# Patient Record
Sex: FEMALE | Race: WHITE | NOT HISPANIC OR LATINO | Employment: FULL TIME | URBAN - METROPOLITAN AREA
[De-identification: names, ages, dates, MRNs, and addresses within clinical notes are randomized per-mention and may not be internally consistent; named-entity substitution may affect disease eponyms.]

---

## 2023-02-14 PROBLEM — N89.8 VAGINAL DISCHARGE: Status: ACTIVE | Noted: 2023-02-14

## 2023-02-14 PROBLEM — R92.8 ABNORMAL MAMMOGRAM: Status: ACTIVE | Noted: 2023-02-14

## 2023-02-14 PROBLEM — M79.672 LEFT FOOT PAIN: Status: ACTIVE | Noted: 2023-02-14

## 2023-02-14 PROBLEM — N39.0 ACUTE UTI: Status: ACTIVE | Noted: 2023-02-14

## 2023-02-14 PROBLEM — N76.0 VAGINITIS: Status: ACTIVE | Noted: 2023-02-14

## 2023-02-14 PROBLEM — J30.9 ALLERGIC RHINITIS: Status: ACTIVE | Noted: 2023-02-14

## 2023-02-14 PROBLEM — R51.9 CHRONIC HEADACHES: Status: ACTIVE | Noted: 2023-02-14

## 2023-02-14 PROBLEM — G89.29 CHRONIC HEADACHES: Status: ACTIVE | Noted: 2023-02-14

## 2023-02-14 PROBLEM — R31.9 HEMATURIA: Status: ACTIVE | Noted: 2023-02-14

## 2023-02-14 PROBLEM — F32.A MILD DEPRESSION: Status: ACTIVE | Noted: 2023-02-14

## 2023-02-14 PROBLEM — M54.2 NECK PAIN: Status: ACTIVE | Noted: 2023-02-14

## 2023-02-14 PROBLEM — M85.80 OSTEOPENIA: Status: ACTIVE | Noted: 2023-02-14

## 2023-02-14 PROBLEM — M72.2 PLANTAR FASCIITIS: Status: ACTIVE | Noted: 2023-02-14

## 2023-02-14 RX ORDER — METHYLPREDNISOLONE 4 MG/1
4 TABLET ORAL
COMMUNITY
Start: 2022-09-08 | End: 2023-03-27 | Stop reason: ALTCHOICE

## 2023-02-14 RX ORDER — CELECOXIB 200 MG/1
200 CAPSULE ORAL DAILY
COMMUNITY
Start: 2020-05-11 | End: 2023-04-04

## 2023-02-14 RX ORDER — BUPROPION HYDROCHLORIDE 150 MG/1
1 TABLET ORAL EVERY MORNING
COMMUNITY
Start: 2022-03-25 | End: 2023-04-04

## 2023-02-14 RX ORDER — METAXALONE 800 MG/1
1 TABLET ORAL 3 TIMES DAILY PRN
COMMUNITY
Start: 2018-05-23 | End: 2023-04-04

## 2023-02-14 RX ORDER — CETIRIZINE HYDROCHLORIDE, PSEUDOEPHEDRINE HYDROCHLORIDE 5; 120 MG/1; MG/1
1 TABLET, FILM COATED, EXTENDED RELEASE ORAL 2 TIMES DAILY PRN
COMMUNITY
Start: 2020-01-02 | End: 2023-04-04

## 2023-03-27 ENCOUNTER — OFFICE VISIT (OUTPATIENT)
Dept: PRIMARY CARE | Facility: CLINIC | Age: 68
End: 2023-03-27
Payer: COMMERCIAL

## 2023-03-27 ENCOUNTER — LAB (OUTPATIENT)
Dept: LAB | Facility: LAB | Age: 68
End: 2023-03-27
Payer: COMMERCIAL

## 2023-03-27 VITALS
BODY MASS INDEX: 24.96 KG/M2 | WEIGHT: 150 LBS | SYSTOLIC BLOOD PRESSURE: 112 MMHG | DIASTOLIC BLOOD PRESSURE: 75 MMHG | TEMPERATURE: 98 F | HEART RATE: 73 BPM

## 2023-03-27 DIAGNOSIS — Z01.411 ENCOUNTER FOR GYNECOLOGICAL EXAMINATION WITH ABNORMAL FINDING: ICD-10-CM

## 2023-03-27 DIAGNOSIS — E66.9 ABDOMINAL OBESITY-METABOLIC SYNDROME TYPE 3: ICD-10-CM

## 2023-03-27 DIAGNOSIS — L70.8 OTHER ACNE: ICD-10-CM

## 2023-03-27 DIAGNOSIS — M85.89 OSTEOPENIA OF MULTIPLE SITES: Primary | ICD-10-CM

## 2023-03-27 DIAGNOSIS — E78.2 MIXED HYPERLIPIDEMIA: ICD-10-CM

## 2023-03-27 DIAGNOSIS — N94.10 DYSPAREUNIA, FEMALE: ICD-10-CM

## 2023-03-27 DIAGNOSIS — E88.810 ABDOMINAL OBESITY-METABOLIC SYNDROME TYPE 3: ICD-10-CM

## 2023-03-27 DIAGNOSIS — F32.A MILD DEPRESSION: ICD-10-CM

## 2023-03-27 LAB
ALANINE AMINOTRANSFERASE (SGPT) (U/L) IN SER/PLAS: 13 U/L (ref 7–45)
ALBUMIN (G/DL) IN SER/PLAS: 4.6 G/DL (ref 3.4–5)
ALKALINE PHOSPHATASE (U/L) IN SER/PLAS: 74 U/L (ref 33–136)
ANION GAP IN SER/PLAS: 12 MMOL/L (ref 10–20)
ASPARTATE AMINOTRANSFERASE (SGOT) (U/L) IN SER/PLAS: 19 U/L (ref 9–39)
BILIRUBIN TOTAL (MG/DL) IN SER/PLAS: 0.7 MG/DL (ref 0–1.2)
CALCIUM (MG/DL) IN SER/PLAS: 9.7 MG/DL (ref 8.6–10.6)
CARBON DIOXIDE, TOTAL (MMOL/L) IN SER/PLAS: 26 MMOL/L (ref 21–32)
CHLORIDE (MMOL/L) IN SER/PLAS: 106 MMOL/L (ref 98–107)
CHOLESTEROL (MG/DL) IN SER/PLAS: 225 MG/DL (ref 0–199)
CHOLESTEROL IN HDL (MG/DL) IN SER/PLAS: 76 MG/DL
CHOLESTEROL/HDL RATIO: 3
CREATININE (MG/DL) IN SER/PLAS: 0.87 MG/DL (ref 0.5–1.05)
ESTIMATED AVERAGE GLUCOSE FOR HBA1C: 97 MG/DL
GFR FEMALE: 73 ML/MIN/1.73M2
GLUCOSE (MG/DL) IN SER/PLAS: 86 MG/DL (ref 74–99)
HEMOGLOBIN A1C/HEMOGLOBIN TOTAL IN BLOOD: 5 %
LDL: 134 MG/DL (ref 0–99)
POTASSIUM (MMOL/L) IN SER/PLAS: 4.5 MMOL/L (ref 3.5–5.3)
PROTEIN TOTAL: 6.9 G/DL (ref 6.4–8.2)
SODIUM (MMOL/L) IN SER/PLAS: 139 MMOL/L (ref 136–145)
TRIGLYCERIDE (MG/DL) IN SER/PLAS: 75 MG/DL (ref 0–149)
UREA NITROGEN (MG/DL) IN SER/PLAS: 19 MG/DL (ref 6–23)
VLDL: 15 MG/DL (ref 0–40)

## 2023-03-27 PROCEDURE — 99214 OFFICE O/P EST MOD 30 MIN: CPT | Performed by: FAMILY MEDICINE

## 2023-03-27 PROCEDURE — 83036 HEMOGLOBIN GLYCOSYLATED A1C: CPT

## 2023-03-27 PROCEDURE — 80053 COMPREHEN METABOLIC PANEL: CPT

## 2023-03-27 PROCEDURE — 99397 PER PM REEVAL EST PAT 65+ YR: CPT | Performed by: FAMILY MEDICINE

## 2023-03-27 PROCEDURE — 80061 LIPID PANEL: CPT

## 2023-03-27 PROCEDURE — 1159F MED LIST DOCD IN RCRD: CPT | Performed by: FAMILY MEDICINE

## 2023-03-27 PROCEDURE — 1036F TOBACCO NON-USER: CPT | Performed by: FAMILY MEDICINE

## 2023-03-27 PROCEDURE — 36415 COLL VENOUS BLD VENIPUNCTURE: CPT

## 2023-03-27 ASSESSMENT — PATIENT HEALTH QUESTIONNAIRE - PHQ9
1. LITTLE INTEREST OR PLEASURE IN DOING THINGS: NOT AT ALL
2. FEELING DOWN, DEPRESSED OR HOPELESS: NOT AT ALL
SUM OF ALL RESPONSES TO PHQ9 QUESTIONS 1 & 2: 0

## 2023-03-27 ASSESSMENT — LIFESTYLE VARIABLES
HOW OFTEN DO YOU HAVE A DRINK CONTAINING ALCOHOL: NEVER
HOW MANY STANDARD DRINKS CONTAINING ALCOHOL DO YOU HAVE ON A TYPICAL DAY: PATIENT DOES NOT DRINK

## 2023-03-27 NOTE — PATIENT INSTRUCTIONS
Weight Management  Provided order for labs with fasting. Will call with the results.   Advised to reduce carbohydrates to 50-75 carbohydrates.  First, keep a food log for 3 days to find average calorie and carbohydrate intake.     Health Maintenance  Patient will call with dates of Prevnar 20.  Discussed Shingrix Series. Patient will call with those dates when she completes them.   Pap Smear Completed in office. Will call with results.     Acne  Advised to use oil free products  Try the Differin gel once daily for 6 - 8 weeks.   Provided referral to new Dermatologist, Dr Cruz.     Pain During [place]  Should patient become sexually active, advised to try lubricant.   If pain persists call for an estrogen cream.

## 2023-03-27 NOTE — PROGRESS NOTES
Subjective   Patient ID: Carlos Layton is a 67 y.o. female who presents for Annual Exam (Here for a physical).    HPI   She believes she has insulin resistance. She has started going to the gym and has gained weight. She does not feel she has done enough to gain muscle weight. She reports that her breasts and her stomach have been gaining weight the most. She has a friend who is a  and gave her an eating plan. She states she follows the plan most of the time. She is requesting to check her A1C. She denies family history of diabetes or PCOS. Her mother had a hysterectomy when she was in her 40s. She has a family history of hypertension.     She is up to date on mammogram in 2022. Her last pap was in February of 2020. Will do today.  She notes she has had a few abnormal results. She is planning to schedule her colonoscopy next.     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 She plans to complete Shingrix Series after that.     She went to dermatology for breakouts on the face. She was told that they did not see any breakouts. However, she has one today.    She is not sexually active, but states the last time she was it was painful.     Review of Systems    Objective   /75   Pulse 73   Temp 36.7 °C (98 °F)   Wt 68 kg (150 lb)   BMI 24.96 kg/m²     Physical exam   HEENT unremarkable  Neck supple no adenopathy or mass  Heart regular S1-S2 without murmur or ectopy  Lungs clear to auscultation  Breast exam no fixed mass or nodule no nipple discharge or retraction no skin changes no supraclavicular or axillary adenopathy  Abdomen is soft nontender physiologic bowel sounds  Pelvic exam cervix appears normal there is no vaginal discharge there is mild thinning of vaginal mucosa bimanual exam is normal without any adnexal masses or tenderness and uterus is not enlarged extremities no edema or cyanosis peripheral pulses are intact and symmetrical    Assessment/Plan   Problem  List Items Addressed This Visit    Reviewed Labs from 04/01/2022, which  revealed blood count WNL.  Chemistries in good range with FBS of 81. Total Cholesterol 215, HDL 79.5, , Triglycerides 49    TSH WNL  Vit D  30      Nervous    Chronic headaches - Primary       Musculoskeletal    Osteopenia       Other    Mild depression (CMS/HCC)   Meds no changes working well  Weight Management  Provided order for labs with fasting. Will call with the results.   Advised to reduce carbohydrates to 50-75 carbohydrates.  First, keep a food log for 3 days to find average calorie and carbohydrate intake.     Health Maintenance  Patient will call with date of Prevnar 20 injection.  Discussed Shingrix Series. Patient will call with those dates when she completes them.   Pap Smear Completed in office. Will call with results.     Acne  Advised to use oil free products  Try the Differin gel once daily for 6 - 8 weeks.   Provided referral to new Dermatologist, Dr Cruz if needed.     Pain During [place]  Should patient become sexually active, advised to try lubricant.   If pain persists call for an estrogen cream.   Problem List Items Addressed This Visit          Nervous    Dyspareunia, female       Musculoskeletal    Osteopenia - Primary       Other    Mild depression (CMS/HCC)    Mixed hyperlipidemia    Relevant Orders    Lipid Panel (Completed)    Other acne     Other Visit Diagnoses       Abdominal obesity-metabolic syndrome type 3        Relevant Orders    Hemoglobin A1C (Completed)    Comprehensive Metabolic Panel (Completed)          Follow up visit in 6 months, unless a visit is needed prior.      Scribe Attestation  By signing my name below, Susanne JOSEPH Scribe   attest that this documentation has been prepared under the direction and in the presence of Brit Leblanc DO.

## 2023-04-04 DIAGNOSIS — M54.2 NECK PAIN: ICD-10-CM

## 2023-04-04 DIAGNOSIS — F32.A MILD DEPRESSION: Primary | ICD-10-CM

## 2023-04-04 DIAGNOSIS — J30.2 SEASONAL ALLERGIES: ICD-10-CM

## 2023-04-04 RX ORDER — BUPROPION HYDROCHLORIDE 150 MG/1
TABLET ORAL
Qty: 90 TABLET | Refills: 1 | Status: SHIPPED | OUTPATIENT
Start: 2023-04-04 | End: 2023-09-18

## 2023-04-04 RX ORDER — CELECOXIB 200 MG/1
CAPSULE ORAL
Qty: 90 CAPSULE | Refills: 1 | Status: SHIPPED | OUTPATIENT
Start: 2023-04-04 | End: 2023-09-18 | Stop reason: SDUPTHER

## 2023-04-04 RX ORDER — CETIRIZINE HYDROCHLORIDE, PSEUDOEPHEDRINE HYDROCHLORIDE 5; 120 MG/1; MG/1
TABLET, FILM COATED, EXTENDED RELEASE ORAL
Qty: 180 TABLET | Refills: 1 | Status: SHIPPED | OUTPATIENT
Start: 2023-04-04 | End: 2023-10-31 | Stop reason: SDUPTHER

## 2023-09-15 DIAGNOSIS — M54.2 NECK PAIN: ICD-10-CM

## 2023-09-15 DIAGNOSIS — F32.A MILD DEPRESSION: ICD-10-CM

## 2023-09-15 NOTE — TELEPHONE ENCOUNTER
LAST SEEN 3/27/23, FUTURE APPT 10/30/23   Pt lvm stating she want to schedule med follow up but she only available on 9/20 after 2pm.    Informed dr is out and she states she come in to much and just want medication sent in. Pt is requesting a refill of all her medication sent to [facility] in [facility]. She is out    Celecoxib 200mg 1x daily  Skelaxin 800mg 3x a day  Bupropion xl 150 1x a day

## 2023-09-18 RX ORDER — BUPROPION HYDROCHLORIDE 150 MG/1
150 TABLET ORAL
Qty: 30 TABLET | Refills: 1 | Status: SHIPPED | OUTPATIENT
Start: 2023-09-18 | End: 2023-10-30 | Stop reason: SDUPTHER

## 2023-09-18 RX ORDER — CELECOXIB 200 MG/1
CAPSULE ORAL
Qty: 30 CAPSULE | Refills: 1 | Status: SHIPPED | OUTPATIENT
Start: 2023-09-18 | End: 2023-10-30 | Stop reason: SDUPTHER

## 2023-10-30 ENCOUNTER — OFFICE VISIT (OUTPATIENT)
Dept: PRIMARY CARE | Facility: CLINIC | Age: 68
End: 2023-10-30
Payer: COMMERCIAL

## 2023-10-30 VITALS
WEIGHT: 151 LBS | HEART RATE: 73 BPM | BODY MASS INDEX: 25.16 KG/M2 | HEIGHT: 65 IN | TEMPERATURE: 96.4 F | DIASTOLIC BLOOD PRESSURE: 67 MMHG | SYSTOLIC BLOOD PRESSURE: 113 MMHG | RESPIRATION RATE: 16 BRPM | OXYGEN SATURATION: 94 %

## 2023-10-30 DIAGNOSIS — N95.2 VAGINAL ATROPHY: ICD-10-CM

## 2023-10-30 DIAGNOSIS — N94.10 DYSPAREUNIA, FEMALE: ICD-10-CM

## 2023-10-30 DIAGNOSIS — E55.9 VITAMIN D DEFICIENCY: ICD-10-CM

## 2023-10-30 DIAGNOSIS — Z12.11 COLON CANCER SCREENING: ICD-10-CM

## 2023-10-30 DIAGNOSIS — E78.2 MIXED HYPERLIPIDEMIA: ICD-10-CM

## 2023-10-30 DIAGNOSIS — Z00.00 PHYSICAL EXAM: ICD-10-CM

## 2023-10-30 DIAGNOSIS — M54.2 NECK PAIN: ICD-10-CM

## 2023-10-30 DIAGNOSIS — Z12.31 BREAST CANCER SCREENING BY MAMMOGRAM: Primary | ICD-10-CM

## 2023-10-30 DIAGNOSIS — F32.A MILD DEPRESSION: ICD-10-CM

## 2023-10-30 PROCEDURE — 99214 OFFICE O/P EST MOD 30 MIN: CPT | Performed by: FAMILY MEDICINE

## 2023-10-30 PROCEDURE — 1036F TOBACCO NON-USER: CPT | Performed by: FAMILY MEDICINE

## 2023-10-30 PROCEDURE — 1126F AMNT PAIN NOTED NONE PRSNT: CPT | Performed by: FAMILY MEDICINE

## 2023-10-30 PROCEDURE — 1159F MED LIST DOCD IN RCRD: CPT | Performed by: FAMILY MEDICINE

## 2023-10-30 RX ORDER — ESTRADIOL 0.1 MG/G
2 CREAM VAGINAL DAILY
Qty: 42.5 G | Refills: 3 | Status: SHIPPED | OUTPATIENT
Start: 2023-10-30 | End: 2024-04-19 | Stop reason: SDUPTHER

## 2023-10-30 RX ORDER — BUPROPION HYDROCHLORIDE 150 MG/1
150 TABLET ORAL
Qty: 90 TABLET | Refills: 1 | Status: SHIPPED | OUTPATIENT
Start: 2023-10-30 | End: 2024-04-19 | Stop reason: SDUPTHER

## 2023-10-30 RX ORDER — CELECOXIB 200 MG/1
CAPSULE ORAL
Qty: 90 CAPSULE | Refills: 1 | Status: SHIPPED | OUTPATIENT
Start: 2023-10-30 | End: 2024-04-19 | Stop reason: SDUPTHER

## 2023-10-30 ASSESSMENT — PAIN SCALES - GENERAL: PAINLEVEL: 0-NO PAIN

## 2023-10-30 ASSESSMENT — PATIENT HEALTH QUESTIONNAIRE - PHQ9
1. LITTLE INTEREST OR PLEASURE IN DOING THINGS: NOT AT ALL
2. FEELING DOWN, DEPRESSED OR HOPELESS: NOT AT ALL
SUM OF ALL RESPONSES TO PHQ9 QUESTIONS 1 AND 2: 0

## 2023-10-30 NOTE — PROGRESS NOTES
"Subjective   Patient ID: Carlos Layton is a 68 y.o. female who presents for Med Refill.    HPI   The patient presents to the clinic for medication refills. She has past medical history of osteopenia and depression.    The patient has been compliant with medication and denies any side-effects to current medication.    Wellbutrin working well for her, anxiety controlled and mood good    She is taking celebrex for neck pain, when not working does not need it    She states that she has recently found a male . She inquires about estrogen creams as she states that she has concerns about pain with sex at her age.  Due for mamm, will order and coming in in Dec for cpe    She reports that she may have a hemorrhoid and her condition often makes it difficult for her to clean herself after defecation.  She states not painful, more like rectal tag, she would like removed, she is due for colonoscopy and will discuss w gi    Her most recent mammogram was done on 10/21/2022. Her most recent labs were done on 03/27/2023. The patient has not had a colonoscopy.  Will order    Review of Systems    Objective   /67   Pulse 73   Temp 35.8 °C (96.4 °F)   Resp 16   Ht 1.651 m (5' 5\")   Wt 68.5 kg (151 lb)   SpO2 94%   BMI 25.13 kg/m²     Physical Exam  Cardiovascular:      Rate and Rhythm: Normal rate and regular rhythm.      Pulses: Normal pulses.      Heart sounds: Normal heart sounds.   Pulmonary:      Effort: Pulmonary effort is normal.      Breath sounds: Normal breath sounds.   Musculoskeletal:         General: Normal range of motion.      Cervical back: Normal range of motion.      Right lower leg: No edema.      Left lower leg: No edema.   Skin:     General: Skin is warm and dry.   Neurological:      Mental Status: She is alert and oriented to person, place, and time.   Psychiatric:         Mood and Affect: Mood normal.         Thought Content: Thought content normal.         Judgment: Judgment normal. "         Assessment/Plan   Problem List Items Addressed This Visit             ICD-10-CM    Mild depression F32.A    Relevant Medications    buPROPion XL (Wellbutrin XL) 150 mg 24 hr tablet    Neck pain M54.2    Relevant Medications    celecoxib (CeleBREX) 200 mg capsule    Mixed hyperlipidemia E78.2    Dyspareunia, female N94.10     Other Visit Diagnoses         Codes    Breast cancer screening by mammogram    -  Primary Z12.31    Relevant Orders    BI mammo bilateral screening tomosynthesis    Colon cancer screening     Z12.11    Relevant Orders    Colonoscopy Screening; Average Risk Patient    Physical exam     Z00.00    Relevant Orders    CBC and Auto Differential    Comprehensive Metabolic Panel    Lipid Panel    TSH with reflex to Free T4 if abnormal    Vitamin D deficiency     E55.9    Relevant Orders    Vitamin D 25-Hydroxy,Total (for eval of Vitamin D levels)    Vaginal atrophy     N95.2    Relevant Medications    estradiol (Estrace) 0.01 % (0.1 mg/gram) vaginal cream            Labs (CBC, CMP, lipid panel, TSH, vitamin D) were ordered for the patient to complete before her next visit. A bilateral mammogram and colonoscopy were ordered for the patient.    In regards to her inquiry about estrogen creams, the patient was advised to use an estrogen  cream 3 times per week and to continue monitoring her condition for possible side-effects.  Disucssed usage and se profile    Possible treatment options for hemorrhoids were discussed with the patient.  She will discuss w GI when has colonoscopy    The patient received refills for her current medication (Wellbutrin  mg, Celebrex 200 mg). She was advised to continue using medication as previously directed.    Will review labs in Dec w pt during her cpe    Scribe Attestation  By signing my name below, I, Senia Coleman, Scribholger   attest that this documentation has been prepared under the direction and in the presence of Brit Leblanc DO.

## 2023-10-31 DIAGNOSIS — J30.2 SEASONAL ALLERGIES: ICD-10-CM

## 2023-10-31 DIAGNOSIS — M54.2 NECK PAIN: Primary | ICD-10-CM

## 2023-10-31 RX ORDER — METAXALONE 800 MG/1
800 TABLET ORAL 3 TIMES DAILY
COMMUNITY
End: 2023-10-31 | Stop reason: SDUPTHER

## 2023-10-31 RX ORDER — CETIRIZINE HYDROCHLORIDE, PSEUDOEPHEDRINE HYDROCHLORIDE 5; 120 MG/1; MG/1
1 TABLET, FILM COATED, EXTENDED RELEASE ORAL 2 TIMES DAILY PRN
Qty: 180 TABLET | Refills: 1 | Status: SHIPPED | OUTPATIENT
Start: 2023-10-31 | End: 2024-04-19 | Stop reason: SDUPTHER

## 2023-10-31 RX ORDER — METAXALONE 800 MG/1
800 TABLET ORAL 3 TIMES DAILY
Qty: 90 TABLET | Refills: 1 | Status: SHIPPED | OUTPATIENT
Start: 2023-10-31 | End: 2024-01-03 | Stop reason: SDUPTHER

## 2023-10-31 RX ORDER — TRETINOIN 0.25 MG/G
CREAM TOPICAL NIGHTLY
COMMUNITY
Start: 2023-08-31

## 2023-12-21 ENCOUNTER — OFFICE VISIT (OUTPATIENT)
Dept: PRIMARY CARE | Facility: CLINIC | Age: 68
End: 2023-12-21
Payer: COMMERCIAL

## 2023-12-21 VITALS
OXYGEN SATURATION: 95 % | RESPIRATION RATE: 16 BRPM | SYSTOLIC BLOOD PRESSURE: 107 MMHG | TEMPERATURE: 97.5 F | HEART RATE: 71 BPM | BODY MASS INDEX: 25.16 KG/M2 | HEIGHT: 65 IN | DIASTOLIC BLOOD PRESSURE: 63 MMHG | WEIGHT: 151 LBS

## 2023-12-21 DIAGNOSIS — F32.A MILD DEPRESSION: ICD-10-CM

## 2023-12-21 DIAGNOSIS — Z01.411 ENCOUNTER FOR GYNECOLOGICAL EXAMINATION WITH ABNORMAL FINDING: Primary | ICD-10-CM

## 2023-12-21 DIAGNOSIS — M85.89 OSTEOPENIA OF MULTIPLE SITES: ICD-10-CM

## 2023-12-21 DIAGNOSIS — M79.672 LEFT FOOT PAIN: ICD-10-CM

## 2023-12-21 DIAGNOSIS — M54.2 NECK PAIN: ICD-10-CM

## 2023-12-21 PROCEDURE — 1126F AMNT PAIN NOTED NONE PRSNT: CPT | Performed by: FAMILY MEDICINE

## 2023-12-21 PROCEDURE — 1036F TOBACCO NON-USER: CPT | Performed by: FAMILY MEDICINE

## 2023-12-21 PROCEDURE — 1159F MED LIST DOCD IN RCRD: CPT | Performed by: FAMILY MEDICINE

## 2023-12-21 PROCEDURE — 99213 OFFICE O/P EST LOW 20 MIN: CPT | Performed by: FAMILY MEDICINE

## 2023-12-21 PROCEDURE — 99397 PER PM REEVAL EST PAT 65+ YR: CPT | Performed by: FAMILY MEDICINE

## 2023-12-21 ASSESSMENT — ANXIETY QUESTIONNAIRES
3. WORRYING TOO MUCH ABOUT DIFFERENT THINGS: SEVERAL DAYS
4. TROUBLE RELAXING: SEVERAL DAYS
IF YOU CHECKED OFF ANY PROBLEMS ON THIS QUESTIONNAIRE, HOW DIFFICULT HAVE THESE PROBLEMS MADE IT FOR YOU TO DO YOUR WORK, TAKE CARE OF THINGS AT HOME, OR GET ALONG WITH OTHER PEOPLE: SOMEWHAT DIFFICULT
7. FEELING AFRAID AS IF SOMETHING AWFUL MIGHT HAPPEN: NOT AT ALL
2. NOT BEING ABLE TO STOP OR CONTROL WORRYING: NOT AT ALL
5. BEING SO RESTLESS THAT IT IS HARD TO SIT STILL: SEVERAL DAYS
GAD7 TOTAL SCORE: 5
6. BECOMING EASILY ANNOYED OR IRRITABLE: SEVERAL DAYS
1. FEELING NERVOUS, ANXIOUS, OR ON EDGE: SEVERAL DAYS

## 2023-12-21 ASSESSMENT — PATIENT HEALTH QUESTIONNAIRE - PHQ9
1. LITTLE INTEREST OR PLEASURE IN DOING THINGS: SEVERAL DAYS
SUM OF ALL RESPONSES TO PHQ9 QUESTIONS 1 AND 2: 2
9. THOUGHTS THAT YOU WOULD BE BETTER OFF DEAD, OR OF HURTING YOURSELF: NOT AT ALL
8. MOVING OR SPEAKING SO SLOWLY THAT OTHER PEOPLE COULD HAVE NOTICED. OR THE OPPOSITE, BEING SO FIGETY OR RESTLESS THAT YOU HAVE BEEN MOVING AROUND A LOT MORE THAN USUAL: NOT AT ALL
10. IF YOU CHECKED OFF ANY PROBLEMS, HOW DIFFICULT HAVE THESE PROBLEMS MADE IT FOR YOU TO DO YOUR WORK, TAKE CARE OF THINGS AT HOME, OR GET ALONG WITH OTHER PEOPLE: SOMEWHAT DIFFICULT
5. POOR APPETITE OR OVEREATING: NEARLY EVERY DAY
6. FEELING BAD ABOUT YOURSELF - OR THAT YOU ARE A FAILURE OR HAVE LET YOURSELF OR YOUR FAMILY DOWN: SEVERAL DAYS
3. TROUBLE FALLING OR STAYING ASLEEP OR SLEEPING TOO MUCH: SEVERAL DAYS
SUM OF ALL RESPONSES TO PHQ QUESTIONS 1-9: 9
2. FEELING DOWN, DEPRESSED OR HOPELESS: SEVERAL DAYS
7. TROUBLE CONCENTRATING ON THINGS, SUCH AS READING THE NEWSPAPER OR WATCHING TELEVISION: SEVERAL DAYS
4. FEELING TIRED OR HAVING LITTLE ENERGY: SEVERAL DAYS

## 2023-12-21 ASSESSMENT — PAIN SCALES - GENERAL: PAINLEVEL: 0-NO PAIN

## 2023-12-21 NOTE — PROGRESS NOTES
"Subjective   Patient ID: Carlos Layton is a 68 y.o. female who presents for Annual Exam and Gynecologic Exam.    HPI   The patient presents to the clinic for an annual physical exam and Pap smear test. She has past medical history of hyperlipidemia, allergic rhinitis, mild depression, osteopenia, and chronic headaches.    The patient denies any current major medical problems and/or concerns at this time.    The patient reports that her insurance will allow her to have another mammogram screening and DEXA bone density scan in April 2024. Her most recent mammogram was done on 10/21/2022 so she is due now, she will call back and schedule.    She declines receiving a flu vaccine in the clinic today.    Buproprion xl working well.  No side effects and feels s/s controlled    Taking celebrex daily for feet pain and neck pain and using Skelaxin as needed for spasm in neck or back    Ordered colonoscopy in Oct 2022 and pt still needs to schedule    Hx osteopenia, taking D3 and calcium, repeat bone density coming spring    Review of Systems    Objective   /63   Pulse 71   Temp 36.4 °C (97.5 °F)   Resp 16   Ht 1.651 m (5' 5\")   Wt 68.5 kg (151 lb)   SpO2 95%   BMI 25.13 kg/m²     Physical Exam  Constitutional:       Appearance: Normal appearance.   Neck:      Vascular: No carotid bruit.   Cardiovascular:      Rate and Rhythm: Normal rate and regular rhythm.      Pulses: Normal pulses.      Heart sounds: Normal heart sounds.   Pulmonary:      Effort: Pulmonary effort is normal.      Breath sounds: Normal breath sounds.   Abdominal:      General: Bowel sounds are normal.      Palpations: Abdomen is soft. There is no mass.      Tenderness: There is no abdominal tenderness.   Genitourinary:     Comments: Cervix appears normal, no signif vag discharge. Thinning vaginal mucosa/dryness Bimanual exam wnl.  Breast exam wnl.  Musculoskeletal:         General: Normal range of motion.      Cervical back: Normal range of " motion.      Right lower leg: No edema.      Left lower leg: No edema.   Lymphadenopathy:      Cervical: No cervical adenopathy.   Skin:     General: Skin is warm and dry.   Neurological:      Mental Status: She is alert and oriented to person, place, and time.   Psychiatric:         Mood and Affect: Mood normal.         Behavior: Behavior normal.         Thought Content: Thought content normal.         Judgment: Judgment normal.         Assessment/Plan   Problem List Items Addressed This Visit             ICD-10-CM    Left foot pain M79.672    Mild depression F32.A    Neck pain M54.2    Osteopenia M85.80    Encounter for gynecological examination with abnormal finding - Primary Z01.411          The patient was advised to try and schedule mamm  again as she is due for another mammogram screening. A bilateral mammogram screening had been ordered for the patient on 10/30/2023.    The patient has active labs (CBC, CMP, lipid panel, TSH, vitamin D) ordered on 10/30/2023. She was advised to complete her labs soon. The clinic will call the patient upon receiving her lab results.    A comprehensive physical exam was conducted on the patient. A Pap smear test was also conducted on the patient.  Will call w results    6 month follow up or prn sooner    Scribe Attestation  By signing my name below, I, Senia Coleman , Scribholger   attest that this documentation has been prepared under the direction and in the presence of Brit Leblanc DO.

## 2023-12-28 DIAGNOSIS — M54.2 NECK PAIN: ICD-10-CM

## 2023-12-30 RX ORDER — METAXALONE 800 MG/1
800 TABLET ORAL 3 TIMES DAILY PRN
Qty: 270 TABLET | Refills: 0 | OUTPATIENT
Start: 2023-12-30

## 2024-01-02 DIAGNOSIS — M54.2 NECK PAIN: ICD-10-CM

## 2024-01-03 DIAGNOSIS — M54.2 NECK PAIN: ICD-10-CM

## 2024-01-03 RX ORDER — METAXALONE 800 MG/1
800 TABLET ORAL 3 TIMES DAILY PRN
Qty: 270 TABLET | Refills: 0 | OUTPATIENT
Start: 2024-01-03

## 2024-01-03 RX ORDER — METAXALONE 800 MG/1
800 TABLET ORAL 3 TIMES DAILY
Qty: 90 TABLET | Refills: 3 | Status: SHIPPED | OUTPATIENT
Start: 2024-01-03 | End: 2024-04-19 | Stop reason: SDUPTHER

## 2024-03-19 ENCOUNTER — LAB (OUTPATIENT)
Dept: LAB | Facility: LAB | Age: 69
End: 2024-03-19
Payer: COMMERCIAL

## 2024-03-19 DIAGNOSIS — Z00.00 PHYSICAL EXAM: ICD-10-CM

## 2024-03-19 LAB
ALBUMIN SERPL BCP-MCNC: 4.6 G/DL (ref 3.4–5)
ALP SERPL-CCNC: 75 U/L (ref 33–136)
ALT SERPL W P-5'-P-CCNC: 13 U/L (ref 7–45)
ANION GAP SERPL CALC-SCNC: 12 MMOL/L (ref 10–20)
AST SERPL W P-5'-P-CCNC: 18 U/L (ref 9–39)
BASOPHILS # BLD AUTO: 0.02 X10*3/UL (ref 0–0.1)
BASOPHILS NFR BLD AUTO: 0.4 %
BILIRUB SERPL-MCNC: 0.6 MG/DL (ref 0–1.2)
BUN SERPL-MCNC: 24 MG/DL (ref 6–23)
CALCIUM SERPL-MCNC: 9.7 MG/DL (ref 8.6–10.6)
CHLORIDE SERPL-SCNC: 107 MMOL/L (ref 98–107)
CHOLEST SERPL-MCNC: 223 MG/DL (ref 0–199)
CHOLESTEROL/HDL RATIO: 3.1
CO2 SERPL-SCNC: 28 MMOL/L (ref 21–32)
CREAT SERPL-MCNC: 0.76 MG/DL (ref 0.5–1.05)
EGFRCR SERPLBLD CKD-EPI 2021: 86 ML/MIN/1.73M*2
EOSINOPHIL # BLD AUTO: 0.06 X10*3/UL (ref 0–0.7)
EOSINOPHIL NFR BLD AUTO: 1.2 %
ERYTHROCYTE [DISTWIDTH] IN BLOOD BY AUTOMATED COUNT: 11.9 % (ref 11.5–14.5)
GLUCOSE SERPL-MCNC: 82 MG/DL (ref 74–99)
HCT VFR BLD AUTO: 43.4 % (ref 36–46)
HDLC SERPL-MCNC: 72.7 MG/DL
HGB BLD-MCNC: 14.7 G/DL (ref 12–16)
IMM GRANULOCYTES # BLD AUTO: 0.01 X10*3/UL (ref 0–0.7)
IMM GRANULOCYTES NFR BLD AUTO: 0.2 % (ref 0–0.9)
LDLC SERPL CALC-MCNC: 139 MG/DL
LYMPHOCYTES # BLD AUTO: 2.44 X10*3/UL (ref 1.2–4.8)
LYMPHOCYTES NFR BLD AUTO: 49.5 %
MCH RBC QN AUTO: 30.7 PG (ref 26–34)
MCHC RBC AUTO-ENTMCNC: 33.9 G/DL (ref 32–36)
MCV RBC AUTO: 91 FL (ref 80–100)
MONOCYTES # BLD AUTO: 0.3 X10*3/UL (ref 0.1–1)
MONOCYTES NFR BLD AUTO: 6.1 %
NEUTROPHILS # BLD AUTO: 2.1 X10*3/UL (ref 1.2–7.7)
NEUTROPHILS NFR BLD AUTO: 42.6 %
NON HDL CHOLESTEROL: 150 MG/DL (ref 0–149)
NRBC BLD-RTO: 0 /100 WBCS (ref 0–0)
PLATELET # BLD AUTO: 187 X10*3/UL (ref 150–450)
POTASSIUM SERPL-SCNC: 4.8 MMOL/L (ref 3.5–5.3)
PROT SERPL-MCNC: 6.8 G/DL (ref 6.4–8.2)
RBC # BLD AUTO: 4.79 X10*6/UL (ref 4–5.2)
SODIUM SERPL-SCNC: 142 MMOL/L (ref 136–145)
TRIGL SERPL-MCNC: 56 MG/DL (ref 0–149)
TSH SERPL-ACNC: 1.59 MIU/L (ref 0.44–3.98)
VLDL: 11 MG/DL (ref 0–40)
WBC # BLD AUTO: 4.9 X10*3/UL (ref 4.4–11.3)

## 2024-03-19 PROCEDURE — 36415 COLL VENOUS BLD VENIPUNCTURE: CPT

## 2024-03-19 PROCEDURE — 80061 LIPID PANEL: CPT

## 2024-03-19 PROCEDURE — 84443 ASSAY THYROID STIM HORMONE: CPT

## 2024-03-19 PROCEDURE — 80053 COMPREHEN METABOLIC PANEL: CPT

## 2024-03-19 PROCEDURE — 85025 COMPLETE CBC W/AUTO DIFF WBC: CPT

## 2024-04-19 ENCOUNTER — OFFICE VISIT (OUTPATIENT)
Dept: PRIMARY CARE | Facility: CLINIC | Age: 69
End: 2024-04-19
Payer: COMMERCIAL

## 2024-04-19 VITALS
DIASTOLIC BLOOD PRESSURE: 68 MMHG | TEMPERATURE: 96.8 F | RESPIRATION RATE: 16 BRPM | HEART RATE: 67 BPM | HEIGHT: 65 IN | OXYGEN SATURATION: 96 % | SYSTOLIC BLOOD PRESSURE: 122 MMHG | WEIGHT: 153 LBS | BODY MASS INDEX: 25.49 KG/M2

## 2024-04-19 DIAGNOSIS — Z12.31 BREAST CANCER SCREENING BY MAMMOGRAM: ICD-10-CM

## 2024-04-19 DIAGNOSIS — E78.2 MIXED HYPERLIPIDEMIA: ICD-10-CM

## 2024-04-19 DIAGNOSIS — M54.2 CERVICAL PAIN: ICD-10-CM

## 2024-04-19 DIAGNOSIS — B35.4 TINEA CORPORIS: Primary | ICD-10-CM

## 2024-04-19 DIAGNOSIS — N95.2 VAGINAL ATROPHY: ICD-10-CM

## 2024-04-19 DIAGNOSIS — M54.2 NECK PAIN: ICD-10-CM

## 2024-04-19 DIAGNOSIS — Z78.0 POST-MENOPAUSAL: ICD-10-CM

## 2024-04-19 DIAGNOSIS — J30.2 SEASONAL ALLERGIES: ICD-10-CM

## 2024-04-19 DIAGNOSIS — Z12.11 COLON CANCER SCREENING: ICD-10-CM

## 2024-04-19 DIAGNOSIS — F32.A MILD DEPRESSION: ICD-10-CM

## 2024-04-19 DIAGNOSIS — M85.89 OSTEOPENIA OF MULTIPLE SITES: ICD-10-CM

## 2024-04-19 PROCEDURE — 1123F ACP DISCUSS/DSCN MKR DOCD: CPT | Performed by: FAMILY MEDICINE

## 2024-04-19 PROCEDURE — 1158F ADVNC CARE PLAN TLK DOCD: CPT | Performed by: FAMILY MEDICINE

## 2024-04-19 PROCEDURE — 1036F TOBACCO NON-USER: CPT | Performed by: FAMILY MEDICINE

## 2024-04-19 PROCEDURE — 1126F AMNT PAIN NOTED NONE PRSNT: CPT | Performed by: FAMILY MEDICINE

## 2024-04-19 PROCEDURE — 1159F MED LIST DOCD IN RCRD: CPT | Performed by: FAMILY MEDICINE

## 2024-04-19 PROCEDURE — 99214 OFFICE O/P EST MOD 30 MIN: CPT | Performed by: FAMILY MEDICINE

## 2024-04-19 RX ORDER — ESTRADIOL 0.1 MG/G
2 CREAM VAGINAL DAILY
Qty: 42.5 G | Refills: 3 | Status: SHIPPED | OUTPATIENT
Start: 2024-04-19

## 2024-04-19 RX ORDER — METHYLPREDNISOLONE 4 MG/1
TABLET ORAL
Qty: 21 TABLET | Refills: 0 | Status: SHIPPED | OUTPATIENT
Start: 2024-04-19 | End: 2024-04-26

## 2024-04-19 RX ORDER — CELECOXIB 200 MG/1
CAPSULE ORAL
Qty: 90 CAPSULE | Refills: 1 | Status: SHIPPED | OUTPATIENT
Start: 2024-04-19

## 2024-04-19 RX ORDER — BUPROPION HYDROCHLORIDE 150 MG/1
150 TABLET ORAL
Qty: 90 TABLET | Refills: 1 | Status: SHIPPED | OUTPATIENT
Start: 2024-04-19

## 2024-04-19 RX ORDER — METAXALONE 800 MG/1
800 TABLET ORAL 3 TIMES DAILY
Qty: 90 TABLET | Refills: 3 | Status: SHIPPED | OUTPATIENT
Start: 2024-04-19 | End: 2024-08-17

## 2024-04-19 RX ORDER — NYSTATIN 100000 U/G
CREAM TOPICAL 2 TIMES DAILY
Qty: 120 G | Refills: 0 | Status: SHIPPED | OUTPATIENT
Start: 2024-04-19 | End: 2024-05-03

## 2024-04-19 RX ORDER — CETIRIZINE HYDROCHLORIDE, PSEUDOEPHEDRINE HYDROCHLORIDE 5; 120 MG/1; MG/1
1 TABLET, FILM COATED, EXTENDED RELEASE ORAL 2 TIMES DAILY PRN
Qty: 180 TABLET | Refills: 1 | Status: SHIPPED | OUTPATIENT
Start: 2024-04-19 | End: 2025-04-19

## 2024-04-19 ASSESSMENT — PATIENT HEALTH QUESTIONNAIRE - PHQ9
SUM OF ALL RESPONSES TO PHQ9 QUESTIONS 1 AND 2: 0
2. FEELING DOWN, DEPRESSED OR HOPELESS: NOT AT ALL
1. LITTLE INTEREST OR PLEASURE IN DOING THINGS: NOT AT ALL

## 2024-04-19 ASSESSMENT — PAIN SCALES - GENERAL: PAINLEVEL: 0-NO PAIN

## 2024-04-19 NOTE — PROGRESS NOTES
Subjective   Patient ID: Carlos Layton is a 68 y.o. female who presents for 4 month f/up.    HPI   The patient presents to the clinic for a 4-month follow-up. She has past medical history of hyperlipidemia, allergic rhinitis, mild depression, osteopenia, plantar fasciitis, chronic headaches, and acne.    She has been compliant with medication and denies any side-effects to current medication.     The patient had labs done on 03/19/2024 and requests to have them reviewed. Her diet includes many fat sources such as cheese and eggs. She states that she eats a lot of fish (salmon). She reports family history of CAD/stroke with her mother and father.    Her blood pressure (122/68) was within normal range when checked in the clinic today.    The patient reports a skin condition (possible infection) in her left armpit. She states that she had been taking doxycycline recently for treatment of another condition and she wonders if this medication may have caused a skin reaction as a side-effect. She denies any changes to her deodorant and/or shaving regimen. She states that she has been suffering from dry skin for several years. Question tinea    The patient states that she temporarily discontinued her cetrizine-pseudoephedrine 5-120 mg medication. She was previously taking this medication for treatment of seasonal allergies. She denies any side-effects to cetrizine-pseudoephedrine medication. She intends to start back on this medication in the near future.    She continues on bupropion  mg daily for treatment of mild depression. She states that bupropion XL medication has been working well to control her depression symptoms. Her mood has been good. She denies any side-effects to her bupropion XL medication.    She reports that she has been suffering from fluctuating symptoms of chronic neck pain (since a prior neck injury suffered in the process of moving to a different house). She recalls that she had a neck pain  "flare-up approximately ~2-3 weeks ago while exercising. She states that the neck pain lasted for ~5 days. She states that this neck pain radiated to the right side of her upper body this time (radiated to the left side of her upper body last time). She states that   she keeps Medrol Dospak on hand (uses it PRN) for treatment of neck pain. She states that Medrol Dospak works well to control her neck pain symptoms and she denies any side-effects to this medication. She believes that the neck pain is neuropathic. She also takes Celebrex 200 mg PRN for treatment of muscular pain/inflammation symptoms. She denies any side-effects to Celebrex medication.  Medrol dose pack was approx 7 years old, she states apin controlled by last day.  She would like one to keep at home in case has the problem again.  Had xray c spine 2-3 years ago, and mri approx 7 years ago with first s/s    Her most recent mammogram screening was done in April 2023. Her most recent DEXA bone density scan was done in April 2022. She has had a colonoscopy screening before (some non-cancerous polyps at the time). She denies any family history of colorectal cancer.    Review of Systems    Objective   /68   Pulse 67   Temp 36 °C (96.8 °F)   Resp 16   Ht 1.651 m (5' 5\")   Wt 69.4 kg (153 lb)   SpO2 96%   BMI 25.46 kg/m²     Physical Exam  Constitutional:       Appearance: Normal appearance.   Neck:      Vascular: No carotid bruit.   Cardiovascular:      Rate and Rhythm: Normal rate and regular rhythm.      Pulses: Normal pulses.      Heart sounds: Normal heart sounds.   Pulmonary:      Effort: Pulmonary effort is normal.      Breath sounds: Normal breath sounds.   Musculoskeletal:         General: Normal range of motion.      Cervical back: Normal range of motion.      Right lower leg: No edema.      Left lower leg: No edema.   Skin:     General: Skin is warm and dry.   Neurological:      General: No focal deficit present.      Mental Status: " She is alert and oriented to person, place, and time.   Psychiatric:         Mood and Affect: Mood normal.         Thought Content: Thought content normal.         Judgment: Judgment normal.         Assessment/Plan   Problem List Items Addressed This Visit             ICD-10-CM    Mild depression F32.A    Relevant Medications    buPROPion XL (Wellbutrin XL) 150 mg 24 hr tablet    Neck pain M54.2    Relevant Medications    celecoxib (CeleBREX) 200 mg capsule    metaxalone (Skelaxin) 800 mg tablet    Osteopenia M85.80    Mixed hyperlipidemia E78.2    Relevant Orders    CT cardiac scoring wo IV contrast     Other Visit Diagnoses         Codes    Tinea corporis    -  Primary B35.4    Relevant Medications    nystatin (Mycostatin) cream    Seasonal allergies     J30.2    Relevant Medications    cetirizine-pseudoephedrine (Aller-Kassy D) 5-120 mg 12 hr tablet    Vaginal atrophy     N95.2    Relevant Medications    estradiol (Estrace) 0.01 % (0.1 mg/gram) vaginal cream    Cervical pain     M54.2    Relevant Medications    methylPREDNISolone (Medrol Dospak) 4 mg tablets    Post-menopausal     Z78.0    Relevant Orders    XR DEXA bone density    Breast cancer screening by mammogram     Z12.31    Relevant Orders    BI mammo bilateral screening tomosynthesis    Colon cancer screening     Z12.11    Relevant Orders    Colonoscopy Screening; Average Risk Patient               The patient's labs (03/19/2024) were reviewed. Blood count was WNL. Chemistries were WNL with FBS of 82. Her kidney function markers were mostly WNL, but urea nitrogen (24) was slightly elevated. Liver enzymes were WNL. Cholesterol results were mostly WNL (HDL 72.7/triglycerides 56), but total cholesterol (223) and LDL (139) were elevated. Her TSH (1.59) was within normal range. She was instructed to lower fats in her diet and to start aerobic exercise to control cholesterol levels. In addition, a CT cardiac scoring study was ordered for the patient for  additional evaluation. The clinic will contact the patient upon receiving her study results.    A mammogram screening, DEXA bone density scan, and colonoscopy screening were ordered for the patient. The clinic will contact the patient upon receiving her screening results.    The patient received refills for current medication (bupropion  mg, Celebrex 200 mg, estradiol 0.01 % vaginal cream, cetrizine-pseudoephedrine 5-120 mg, metaxalone 800 mg, Medrol Dospak 4 mg). She was instructed to continue taking medication as previously directed.    In regards to concerns with a skin condition in her left armpit, differential diagnoses (fungal infection, dry skin, etc) were discussed with the patient. Following discussion, the patient received a prescription for nystatin cream. She was instructed to apply this powder topically 2 times per day for 14 days. In addition, she was advised to start taking an Omega-3 fish oil supplement to improve dry skin symptoms. Continue monitoring symptoms for improvement/exacerbation.    In regards to concerns with fluctuating symptoms of chronic neck pain, the patient was given medrol dose pack to use if s/s started again at some point and then would follow up and will continue using Celebrex 200 mg medications for treatment of this condition. She was advised to continue monitoring symptoms for improvement/exacerbation.    She will follow-up in 6 months, unless otherwise needed.    Scribe Attestation  By signing my name below, ISenia Scribe   attest that this documentation has been prepared under the direction and in the presence of Brit Leblanc DO.

## 2024-05-02 ENCOUNTER — TELEPHONE (OUTPATIENT)
Dept: GASTROENTEROLOGY | Facility: HOSPITAL | Age: 69
End: 2024-05-02
Payer: COMMERCIAL

## 2024-05-10 ENCOUNTER — HOSPITAL ENCOUNTER (OUTPATIENT)
Dept: RADIOLOGY | Facility: CLINIC | Age: 69
Discharge: HOME | End: 2024-05-10
Payer: COMMERCIAL

## 2024-05-10 VITALS — HEIGHT: 65 IN | WEIGHT: 153 LBS | BODY MASS INDEX: 25.49 KG/M2

## 2024-05-10 DIAGNOSIS — Z12.31 BREAST CANCER SCREENING BY MAMMOGRAM: ICD-10-CM

## 2024-05-10 DIAGNOSIS — Z78.0 POST-MENOPAUSAL: ICD-10-CM

## 2024-05-10 PROCEDURE — 77080 DXA BONE DENSITY AXIAL: CPT | Performed by: RADIOLOGY

## 2024-05-10 PROCEDURE — 77067 SCR MAMMO BI INCL CAD: CPT | Performed by: STUDENT IN AN ORGANIZED HEALTH CARE EDUCATION/TRAINING PROGRAM

## 2024-05-10 PROCEDURE — 77067 SCR MAMMO BI INCL CAD: CPT

## 2024-05-10 PROCEDURE — 77063 BREAST TOMOSYNTHESIS BI: CPT | Performed by: STUDENT IN AN ORGANIZED HEALTH CARE EDUCATION/TRAINING PROGRAM

## 2024-05-10 PROCEDURE — 77080 DXA BONE DENSITY AXIAL: CPT

## 2024-05-14 ENCOUNTER — TELEPHONE (OUTPATIENT)
Dept: PRIMARY CARE | Facility: CLINIC | Age: 69
End: 2024-05-14
Payer: COMMERCIAL

## 2024-05-14 NOTE — TELEPHONE ENCOUNTER
----- Message from Brit Leblanc DO sent at 5/13/2024  4:30 PM EDT -----  Report to pt her bone denstiy shows osteoporosis in her spine and hip and osteopenia in her femur.  I would recommend starting medication for osteoporosis and would like to discuss w pt.  All 3 areas have worsened since last scan

## 2024-05-14 NOTE — TELEPHONE ENCOUNTER
----- Message from Brit Leblanc DO sent at 5/13/2024  4:25 PM EDT -----  Report to pt her mamm is wnl and repeat one year

## 2024-05-22 ENCOUNTER — TELEPHONE (OUTPATIENT)
Dept: PRIMARY CARE | Facility: CLINIC | Age: 69
End: 2024-05-22
Payer: COMMERCIAL

## 2024-05-22 NOTE — TELEPHONE ENCOUNTER
Lvm for patient asked her to call [facility] and change her colonoscopy and let them know what she is available for

## 2024-05-22 NOTE — TELEPHONE ENCOUNTER
PATIENT CALLED AND TO INFORM THAT SHE CANNOT SCHEDULE FOR HER COLONOSCOPY.PATIENT INFORMED ME THAT SHE NEEDS A FRIDAY APPT. PATIENT ADVISED THAT SHE MAY NEED TO CHANGE HER AVAILABILITY TO GET IN SOONER.PLEASE CALL

## 2024-05-31 ENCOUNTER — APPOINTMENT (OUTPATIENT)
Dept: PRIMARY CARE | Facility: CLINIC | Age: 69
End: 2024-05-31
Payer: COMMERCIAL

## 2024-05-31 ENCOUNTER — TELEPHONE (OUTPATIENT)
Dept: PRIMARY CARE | Facility: CLINIC | Age: 69
End: 2024-05-31

## 2024-05-31 NOTE — TELEPHONE ENCOUNTER
Patient called and left VM, need to r/s cancelled appointment for today.  Transferred call to MA to schedule.

## 2024-06-19 DIAGNOSIS — Z98.890 POSTOPERATIVE NAUSEA: Primary | ICD-10-CM

## 2024-06-19 DIAGNOSIS — R11.0 POSTOPERATIVE NAUSEA: Primary | ICD-10-CM

## 2024-06-19 RX ORDER — ONDANSETRON HYDROCHLORIDE 8 MG/1
8 TABLET, FILM COATED ORAL 3 TIMES DAILY
Qty: 20 TABLET | Refills: 0 | Status: SHIPPED | OUTPATIENT
Start: 2024-06-19 | End: 2024-06-29

## 2024-06-24 ENCOUNTER — ANESTHESIA (OUTPATIENT)
Dept: GASTROENTEROLOGY | Facility: HOSPITAL | Age: 69
End: 2024-06-24
Payer: COMMERCIAL

## 2024-06-24 ENCOUNTER — HOSPITAL ENCOUNTER (OUTPATIENT)
Dept: GASTROENTEROLOGY | Facility: HOSPITAL | Age: 69
Setting detail: OUTPATIENT SURGERY
Discharge: HOME | End: 2024-06-24
Payer: COMMERCIAL

## 2024-06-24 ENCOUNTER — ANESTHESIA EVENT (OUTPATIENT)
Dept: GASTROENTEROLOGY | Facility: HOSPITAL | Age: 69
End: 2024-06-24
Payer: COMMERCIAL

## 2024-06-24 VITALS
DIASTOLIC BLOOD PRESSURE: 67 MMHG | BODY MASS INDEX: 24.28 KG/M2 | RESPIRATION RATE: 16 BRPM | SYSTOLIC BLOOD PRESSURE: 103 MMHG | OXYGEN SATURATION: 98 % | WEIGHT: 145.72 LBS | TEMPERATURE: 96.8 F | HEART RATE: 69 BPM | HEIGHT: 65 IN

## 2024-06-24 DIAGNOSIS — Z12.11 COLON CANCER SCREENING: ICD-10-CM

## 2024-06-24 PROBLEM — N39.0 ACUTE UTI: Status: RESOLVED | Noted: 2023-02-14 | Resolved: 2024-06-24

## 2024-06-24 PROCEDURE — 7100000009 HC PHASE TWO TIME - INITIAL BASE CHARGE

## 2024-06-24 PROCEDURE — 45385 COLONOSCOPY W/LESION REMOVAL: CPT | Performed by: INTERNAL MEDICINE

## 2024-06-24 PROCEDURE — 2500000004 HC RX 250 GENERAL PHARMACY W/ HCPCS (ALT 636 FOR OP/ED): Performed by: INTERNAL MEDICINE

## 2024-06-24 PROCEDURE — 3700000001 HC GENERAL ANESTHESIA TIME - INITIAL BASE CHARGE

## 2024-06-24 PROCEDURE — 2500000004 HC RX 250 GENERAL PHARMACY W/ HCPCS (ALT 636 FOR OP/ED): Performed by: ANESTHESIOLOGIST ASSISTANT

## 2024-06-24 PROCEDURE — 7100000010 HC PHASE TWO TIME - EACH INCREMENTAL 1 MINUTE

## 2024-06-24 PROCEDURE — 3700000002 HC GENERAL ANESTHESIA TIME - EACH INCREMENTAL 1 MINUTE

## 2024-06-24 PROCEDURE — 2500000005 HC RX 250 GENERAL PHARMACY W/O HCPCS: Performed by: ANESTHESIOLOGIST ASSISTANT

## 2024-06-24 RX ORDER — MIDAZOLAM HYDROCHLORIDE 1 MG/ML
INJECTION INTRAMUSCULAR; INTRAVENOUS AS NEEDED
Status: DISCONTINUED | OUTPATIENT
Start: 2024-06-24 | End: 2024-06-24

## 2024-06-24 RX ORDER — LIDOCAINE HYDROCHLORIDE 20 MG/ML
INJECTION, SOLUTION EPIDURAL; INFILTRATION; INTRACAUDAL; PERINEURAL AS NEEDED
Status: DISCONTINUED | OUTPATIENT
Start: 2024-06-24 | End: 2024-06-24

## 2024-06-24 RX ORDER — PROPOFOL 10 MG/ML
INJECTION, EMULSION INTRAVENOUS CONTINUOUS PRN
Status: DISCONTINUED | OUTPATIENT
Start: 2024-06-24 | End: 2024-06-24

## 2024-06-24 RX ORDER — SODIUM CHLORIDE, SODIUM LACTATE, POTASSIUM CHLORIDE, CALCIUM CHLORIDE 600; 310; 30; 20 MG/100ML; MG/100ML; MG/100ML; MG/100ML
20 INJECTION, SOLUTION INTRAVENOUS CONTINUOUS
Status: DISCONTINUED | OUTPATIENT
Start: 2024-06-24 | End: 2024-06-25 | Stop reason: HOSPADM

## 2024-06-24 SDOH — HEALTH STABILITY: MENTAL HEALTH: CURRENT SMOKER: 0

## 2024-06-24 ASSESSMENT — PAIN - FUNCTIONAL ASSESSMENT
PAIN_FUNCTIONAL_ASSESSMENT: 0-10

## 2024-06-24 ASSESSMENT — COLUMBIA-SUICIDE SEVERITY RATING SCALE - C-SSRS
1. IN THE PAST MONTH, HAVE YOU WISHED YOU WERE DEAD OR WISHED YOU COULD GO TO SLEEP AND NOT WAKE UP?: NO
2. HAVE YOU ACTUALLY HAD ANY THOUGHTS OF KILLING YOURSELF?: NO
6. HAVE YOU EVER DONE ANYTHING, STARTED TO DO ANYTHING, OR PREPARED TO DO ANYTHING TO END YOUR LIFE?: NO

## 2024-06-24 ASSESSMENT — PAIN SCALES - GENERAL
PAINLEVEL_OUTOF10: 0 - NO PAIN

## 2024-06-24 NOTE — ANESTHESIA POSTPROCEDURE EVALUATION
Patient: Carlos Layton    Procedure Summary       Date: 06/24/24 Room / Location: [facility] [place]    Anesthesia Start: 0834 Anesthesia Stop: 0904    Procedure: COLONOSCOPY Diagnosis: Colon cancer screening    Scheduled Providers: Marvin Carrera DO; Evon Galdamez MD; CHANO Sheridan Responsible Provider: Evon Galdamez MD    Anesthesia Type: MAC ASA Status: 2            Anesthesia Type: MAC    Vitals Value Taken Time   /67 06/24/24 0919   Temp 36 °C (96.8 °F) 06/24/24 0901   Pulse 69 06/24/24 0929   Resp 16 06/24/24 0916   SpO2 97 % 06/24/24 0916   Vitals shown include unfiled device data.    Anesthesia Post Evaluation    Patient location during evaluation: PACU  Patient participation: complete - patient participated  Level of consciousness: awake and alert  Pain management: adequate  Airway patency: patent  Cardiovascular status: stable  Respiratory status: spontaneous ventilation  Hydration status: acceptable  Postoperative Nausea and Vomiting: none        There were no known notable events for this encounter.

## 2024-06-24 NOTE — ANESTHESIA PREPROCEDURE EVALUATION
Patient: Carlos Layton    Procedure Information       Date/Time: 06/24/24 0850    Scheduled providers: Marvin Carrera DO; Evon Galdamez MD; CHANO Sheridan    Procedure: COLONOSCOPY    Location: [facility]                                                           Pre-Anesthesia Evaluation      Carlos Layton is a 68 y.o. female  with PMH of depression, HPL  and chronic headaches who presents for procedure stated above.     Past Surgical History:   Procedure Laterality Date    OTHER SURGICAL HISTORY  02/15/2019    Hysteroscopic uterine polypectomy    OTHER SURGICAL HISTORY  01/17/2019    Tonsillectomy     Social History reviewed  She reports that she has never smoked. She has never used smokeless tobacco. She reports that she does not currently use alcohol. She reports that she does not currently use drugs.  Allergies reviewed  Allergies   Allergen Reactions    Azithromycin Unknown and Rash     Current Outpatient Medications   Medication Instructions    buPROPion XL (WELLBUTRIN XL) 150 mg, oral, Daily before breakfast    celecoxib (CeleBREX) 200 mg capsule TAKE ONE CAPSULE BY MOUTH DAILY AS NEEDED    cetirizine-pseudoephedrine (Aller-Kassy D) 5-120 mg 12 hr tablet 1 tablet, oral, 2 times daily PRN    estradiol (ESTRACE) 2 g, vaginal, Daily, 3 days per week    metaxalone (SKELAXIN) 800 mg, oral, 3 times daily    ondansetron (ZOFRAN) 8 mg, oral, 3 times daily    tretinoin (Retin-A) 0.025 % cream Topical, Nightly       Lab Results   Component Value Date/Time    WBC 4.9 03/19/2024 0833    HGB 14.7 03/19/2024 0833    HCT 43.4 03/19/2024 0833     03/19/2024 0833       Chemistry    Lab Results   Component Value Date/Time     03/19/2024 0833    K 4.8 03/19/2024 0833     03/19/2024 0833    CO2 28 03/19/2024 0833    BUN 24 (H) 03/19/2024 0833    CREATININE 0.76 03/19/2024 0833    Lab Results   Component Value Date/Time    CALCIUM 9.7 03/19/2024 0833    ALKPHOS 75 03/19/2024 0833    AST 18  "03/19/2024 0833    ALT 13 03/19/2024 0833    BILITOT 0.6 03/19/2024 0833          No results found for: \"PROTIME\", \"PTT\", \"INR\"  Lab Results   Component Value Date    HGBA1C 5.0 03/27/2023     Lab Results   Component Value Date    ABO A 02/15/2019     Recent Labs     03/19/24  0833 04/01/22  0944 01/20/20  0904   TSH 1.59 1.12 1.57       Visit Vitals  /56   Pulse 67   Temp 36 °C (96.8 °F) (Temporal)   Resp 16   Ht 1.651 m (5' 5\")   Wt 66.1 kg (145 lb 11.6 oz)   LMP  (LMP Unknown)   SpO2 99%   BMI 24.25 kg/m²   OB Status Postmenopausal   Smoking Status Never   BSA 1.74 m²            Relevant Problems   Cardiac   (+) Mixed hyperlipidemia      Neuro   (+) Chronic headaches   (+) Mild depression       Clinical information reviewed:   Tobacco  Allergies  Meds   Med Hx  Surg Hx  OB Status  Fam Hx  Soc   Hx        NPO Detail:  NPO/Void Status  Carbohydrate Drink Given Prior to Surgery? : N  Date of Last Liquid: 06/24/24  Time of Last Liquid: 0430  Date of Last Solid: 06/23/24  Time of Last Solid: 0900  Last Intake Type: Clear fluids  Time of Last Void: 0600         Physical Exam    Airway  Mallampati: II  TM distance: >3 FB  Neck ROM: full     Cardiovascular   Rhythm: regular  Rate: normal     Dental - normal exam     Pulmonary   Comments: Normal RR  Non-labored respiration   Abdominal            Anesthesia Plan    History of general anesthesia?: yes  History of complications of general anesthesia?: no    ASA 2     MAC   (With standard ASA monitoring.)  The patient is not a current smoker.    intravenous induction   Anesthetic plan and risks discussed with patient.    Plan discussed with CRNA and CAA.      "

## 2024-06-24 NOTE — DISCHARGE INSTRUCTIONS

## 2024-06-25 ASSESSMENT — PAIN SCALES - GENERAL: PAINLEVEL_OUTOF10: 0 - NO PAIN

## 2024-06-28 ENCOUNTER — HOSPITAL ENCOUNTER (EMERGENCY)
Facility: HOSPITAL | Age: 69
Discharge: HOME | End: 2024-06-28
Payer: COMMERCIAL

## 2024-06-28 ENCOUNTER — APPOINTMENT (OUTPATIENT)
Dept: RADIOLOGY | Facility: HOSPITAL | Age: 69
End: 2024-06-28
Payer: COMMERCIAL

## 2024-06-28 VITALS
BODY MASS INDEX: 24.16 KG/M2 | DIASTOLIC BLOOD PRESSURE: 76 MMHG | RESPIRATION RATE: 16 BRPM | HEART RATE: 64 BPM | SYSTOLIC BLOOD PRESSURE: 112 MMHG | WEIGHT: 145 LBS | HEIGHT: 65 IN | TEMPERATURE: 97.5 F | OXYGEN SATURATION: 100 %

## 2024-06-28 DIAGNOSIS — S63.501A SPRAIN OF RIGHT WRIST, INITIAL ENCOUNTER: Primary | ICD-10-CM

## 2024-06-28 PROCEDURE — 73130 X-RAY EXAM OF HAND: CPT | Mod: RIGHT SIDE | Performed by: RADIOLOGY

## 2024-06-28 PROCEDURE — 73130 X-RAY EXAM OF HAND: CPT | Mod: RT

## 2024-06-28 PROCEDURE — 2500000001 HC RX 250 WO HCPCS SELF ADMINISTERED DRUGS (ALT 637 FOR MEDICARE OP): Performed by: NURSE PRACTITIONER

## 2024-06-28 PROCEDURE — 73110 X-RAY EXAM OF WRIST: CPT | Mod: RIGHT SIDE | Performed by: RADIOLOGY

## 2024-06-28 PROCEDURE — 99284 EMERGENCY DEPT VISIT MOD MDM: CPT

## 2024-06-28 PROCEDURE — 73110 X-RAY EXAM OF WRIST: CPT | Mod: RT

## 2024-06-28 RX ORDER — CYCLOBENZAPRINE HCL 5 MG
5 TABLET ORAL ONCE
Status: COMPLETED | OUTPATIENT
Start: 2024-06-28 | End: 2024-06-28

## 2024-06-28 RX ORDER — NAPROXEN 500 MG/1
500 TABLET ORAL
Qty: 20 TABLET | Refills: 0 | Status: SHIPPED | OUTPATIENT
Start: 2024-06-28 | End: 2024-07-08

## 2024-06-28 RX ORDER — TIZANIDINE 2 MG/1
4 TABLET ORAL 2 TIMES DAILY
Qty: 28 TABLET | Refills: 0 | Status: SHIPPED | OUTPATIENT
Start: 2024-06-28 | End: 2024-07-05

## 2024-06-28 RX ORDER — NAPROXEN 500 MG/1
500 TABLET ORAL ONCE
Status: COMPLETED | OUTPATIENT
Start: 2024-06-28 | End: 2024-06-28

## 2024-06-28 ASSESSMENT — PAIN - FUNCTIONAL ASSESSMENT
PAIN_FUNCTIONAL_ASSESSMENT: 0-10
PAIN_FUNCTIONAL_ASSESSMENT: 0-10

## 2024-06-28 ASSESSMENT — COLUMBIA-SUICIDE SEVERITY RATING SCALE - C-SSRS
1. IN THE PAST MONTH, HAVE YOU WISHED YOU WERE DEAD OR WISHED YOU COULD GO TO SLEEP AND NOT WAKE UP?: NO
6. HAVE YOU EVER DONE ANYTHING, STARTED TO DO ANYTHING, OR PREPARED TO DO ANYTHING TO END YOUR LIFE?: NO
2. HAVE YOU ACTUALLY HAD ANY THOUGHTS OF KILLING YOURSELF?: NO

## 2024-06-28 ASSESSMENT — PAIN SCALES - GENERAL
PAINLEVEL_OUTOF10: 5 - MODERATE PAIN
PAINLEVEL_OUTOF10: 5 - MODERATE PAIN

## 2024-06-28 ASSESSMENT — PAIN DESCRIPTION - ORIENTATION: ORIENTATION: RIGHT

## 2024-06-28 ASSESSMENT — PAIN DESCRIPTION - LOCATION: LOCATION: GENERALIZED

## 2024-06-28 NOTE — ED PROVIDER NOTES
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  She slipped and landed on her right hand and wrist and now endorses 5 out of 10 pain with bruising.  She experienced the same sort of pain 5 years ago when she fractured her left fifth finger and she believes that she might have another fracture.  She can supinate and pronate but pain increases.  All vital signs were normal and stable in triage.  She is not on any anticoagulant medication.  She denies striking her head or LOC.  She denies posterior neck, thoracic, or lumbar pain.  She denies chest pain or dyspnea.  She denies abdominal pain, nausea, or vomiting.      History provided by:  Patient   used: No                        Antonio Coma Scale Score: 15                     Patient History   Past Medical History:   Diagnosis Date    Arthritis     Personal history of other diseases of the female genital tract 02/05/2019    History of postmenopausal bleeding    Polyp of corpus uteri 02/05/2019    Endometrial polyp     Past Surgical History:   Procedure Laterality Date    OTHER SURGICAL HISTORY  02/15/2019    Hysteroscopic uterine polypectomy    OTHER SURGICAL HISTORY  01/17/2019    Tonsillectomy     Family History   Problem Relation Name Age of Onset    Other (CEREBROVASCULAR ACCIDENT) Mother      Other (CARDIAC DISORDER) Father      Kidney failure Father       Social History     Tobacco Use    Smoking status: Never    Smokeless tobacco: Never   Substance Use Topics    Alcohol use: Not Currently    Drug use: Not Currently       Physical Exam   ED Triage Vitals [06/28/24 1329]   Temperature Heart Rate Respirations BP   36.4 °C (97.5 °F) 70 18 123/85      Pulse Ox Temp Source Heart Rate Source Patient Position   100 % Temporal Monitor --      BP Location FiO2 (%)     -- --        Physical Exam  Constitutional:       Appearance: Normal appearance.   HENT:      Head: Normocephalic and atraumatic.      Right Ear: Tympanic membrane normal.      Left Ear: Tympanic membrane normal.      Nose: Nose normal.      Mouth/Throat:      Mouth: Mucous membranes are moist.   Eyes:      Extraocular Movements: Extraocular movements intact.      Pupils: Pupils are equal, round, and reactive to light.   Cardiovascular:      Rate and Rhythm: Normal rate and regular rhythm.      Pulses: Normal pulses.      Heart sounds: Normal heart sounds.   Pulmonary:      Effort: Pulmonary effort is normal.      Breath sounds: Normal breath sounds.   Abdominal:      General: Abdomen is flat.      Palpations: Abdomen is soft.   Musculoskeletal:         General: Tenderness present.      Cervical back: Normal range of motion and neck supple.   Skin:     General: Skin is warm.      Capillary Refill: Capillary refill takes less than 2 seconds.      Findings: Bruising present.   Neurological:      General: No focal deficit present.      Mental Status: She is alert and oriented to person, place, and time.   Psychiatric:         Mood and Affect: Mood normal.         Behavior: Behavior normal.         ED Course & MDM   Diagnoses as of 06/28/24 1939   Sprain of right wrist, initial encounter       Medical Decision Making  Xray ordered right wrist and hand.  Patient did not want to wait around for foundation to read her imaging.  I followed up on results and both were negative for acute fracture or malalignment. She was placed in a Velcro thumb spica wrist splint.  I requested appointment with Dr. Ehsan Roa hand surgery.  She can use tizanidine and Naprosyn for pain management.  Safely discharged home with return precautions. Radial pulse 2/2. Cap refill less than 2 seconds.    Amount and/or Complexity of Data Reviewed  Radiology: ordered and independent interpretation performed.     Details: I did not appreciate fracture of  hand or wrist imaging however x-ray was done at [facility].  I contacted [facility] ops and asked him to  the imaging and have it read.        Procedure  Procedures     TORRIE Heredia-CNP  06/28/24 1526       TORRIE Heredia-DWAIN  06/28/24 1939

## 2024-06-28 NOTE — ED TRIAGE NOTES
Pt had a mechanical fall today while shopping at [employer]. Pt states that someone spilled perfume on the floor and she slipped on it falling and injuring her right rist, hip, leg and back. Pt did not recall hitting her head and denies being on any thinners at this time.

## 2024-06-28 NOTE — Clinical Note
Carlos Layton was seen and treated in our emergency department on 6/28/2024.  She may return to work on 07/01/2024.       If you have any questions or concerns, please don't hesitate to call.      Ever Anglin, APRN-CNP

## 2024-07-03 LAB
LABORATORY COMMENT REPORT: NORMAL
PATH REPORT.FINAL DX SPEC: NORMAL
PATH REPORT.GROSS SPEC: NORMAL
PATH REPORT.TOTAL CANCER: NORMAL

## 2024-08-10 ENCOUNTER — HOSPITAL ENCOUNTER (OUTPATIENT)
Dept: RADIOLOGY | Facility: CLINIC | Age: 69
Discharge: HOME | End: 2024-08-10
Payer: COMMERCIAL

## 2024-08-10 DIAGNOSIS — E78.2 MIXED HYPERLIPIDEMIA: ICD-10-CM

## 2024-08-10 PROCEDURE — 75571 CT HRT W/O DYE W/CA TEST: CPT

## 2024-08-14 ENCOUNTER — TELEPHONE (OUTPATIENT)
Dept: PRIMARY CARE | Facility: CLINIC | Age: 69
End: 2024-08-14
Payer: COMMERCIAL

## 2024-08-14 NOTE — TELEPHONE ENCOUNTER
----- Message from Brit Leblanc sent at 8/11/2024 10:30 PM EDT -----  Report to pt her coronary calcium score is zero

## 2024-10-05 DIAGNOSIS — F32.A MILD DEPRESSION: ICD-10-CM

## 2024-10-10 RX ORDER — BUPROPION HYDROCHLORIDE 150 MG/1
TABLET ORAL
Qty: 90 TABLET | Refills: 0 | Status: SHIPPED | OUTPATIENT
Start: 2024-10-10

## 2024-10-18 ENCOUNTER — APPOINTMENT (OUTPATIENT)
Dept: PRIMARY CARE | Facility: CLINIC | Age: 69
End: 2024-10-18
Payer: COMMERCIAL

## 2024-10-18 VITALS
TEMPERATURE: 98.9 F | OXYGEN SATURATION: 96 % | HEART RATE: 77 BPM | WEIGHT: 147 LBS | DIASTOLIC BLOOD PRESSURE: 61 MMHG | BODY MASS INDEX: 24.49 KG/M2 | HEIGHT: 65 IN | SYSTOLIC BLOOD PRESSURE: 95 MMHG

## 2024-10-18 DIAGNOSIS — M54.2 NECK PAIN: ICD-10-CM

## 2024-10-18 DIAGNOSIS — M81.0 AGE-RELATED OSTEOPOROSIS WITHOUT CURRENT PATHOLOGICAL FRACTURE: ICD-10-CM

## 2024-10-18 DIAGNOSIS — J30.1 NON-SEASONAL ALLERGIC RHINITIS DUE TO POLLEN: ICD-10-CM

## 2024-10-18 DIAGNOSIS — F32.A MILD DEPRESSION: Primary | ICD-10-CM

## 2024-10-18 DIAGNOSIS — N95.2 VAGINAL ATROPHY: ICD-10-CM

## 2024-10-18 DIAGNOSIS — J30.2 SEASONAL ALLERGIES: ICD-10-CM

## 2024-10-18 PROBLEM — M85.80 OSTEOPENIA: Status: RESOLVED | Noted: 2023-02-14 | Resolved: 2024-10-18

## 2024-10-18 PROCEDURE — 1036F TOBACCO NON-USER: CPT | Performed by: FAMILY MEDICINE

## 2024-10-18 PROCEDURE — 1159F MED LIST DOCD IN RCRD: CPT | Performed by: FAMILY MEDICINE

## 2024-10-18 PROCEDURE — 1158F ADVNC CARE PLAN TLK DOCD: CPT | Performed by: FAMILY MEDICINE

## 2024-10-18 PROCEDURE — 1123F ACP DISCUSS/DSCN MKR DOCD: CPT | Performed by: FAMILY MEDICINE

## 2024-10-18 PROCEDURE — 99214 OFFICE O/P EST MOD 30 MIN: CPT | Performed by: FAMILY MEDICINE

## 2024-10-18 PROCEDURE — 3008F BODY MASS INDEX DOCD: CPT | Performed by: FAMILY MEDICINE

## 2024-10-18 PROCEDURE — 1160F RVW MEDS BY RX/DR IN RCRD: CPT | Performed by: FAMILY MEDICINE

## 2024-10-18 RX ORDER — METAXALONE 800 MG/1
800 TABLET ORAL 3 TIMES DAILY PRN
Qty: 90 TABLET | Refills: 2 | Status: SHIPPED | OUTPATIENT
Start: 2024-10-18

## 2024-10-18 RX ORDER — CETIRIZINE HYDROCHLORIDE, PSEUDOEPHEDRINE HYDROCHLORIDE 5; 120 MG/1; MG/1
1 TABLET, FILM COATED, EXTENDED RELEASE ORAL 2 TIMES DAILY PRN
Qty: 180 TABLET | Refills: 1 | Status: SHIPPED | OUTPATIENT
Start: 2024-10-18 | End: 2025-10-18

## 2024-10-18 RX ORDER — ESTRADIOL 0.1 MG/G
2 CREAM VAGINAL DAILY
Qty: 42.5 G | Refills: 5 | Status: SHIPPED | OUTPATIENT
Start: 2024-10-18

## 2024-10-18 RX ORDER — BUPROPION HYDROCHLORIDE 150 MG/1
150 TABLET ORAL EVERY MORNING
Qty: 90 TABLET | Refills: 1 | Status: SHIPPED | OUTPATIENT
Start: 2024-10-18

## 2024-10-18 RX ORDER — CELECOXIB 200 MG/1
CAPSULE ORAL
Qty: 90 CAPSULE | Refills: 1 | Status: SHIPPED | OUTPATIENT
Start: 2024-10-18

## 2024-10-18 NOTE — PROGRESS NOTES
Subjective   Patient ID: Carlos Layton is a 69 y.o. female who presents for 6 month fuv.    HPI   The patient presents to the clinic for a 6-month follow-up. She has past medical history of hyperlipidemia, allergic rhinitis, mild depression, osteopenia, plantar fasciitis, chronic headaches, and acne.    She continues on bupropion  mg daily for treatment of depression. She states that bupropion XL medication has been working well to control her depression symptoms. Her mood has been good. She denies any side-effects to her bupropion XL medication.    She takes cetirizine-pseudoephedrine 5-120 mg (2 times daily PRN) for treatment of seasonal allergies. Her allergy symptoms have been fairly controlled on medication. She denies any side-effects to her cetirizine-pseudoephedrine medication. She may also take OTC Allegra-D for treatment of seasonal allergies occasionally since it works better but her insurance does not cover it.    She is on Celebrex 200 mg (once daily PRN) and metaxalone 800 mg (3 times daily) for treatment of neck pain. Her neck pain has been controlled on these medications. She denies any side-effects to her Celebrex and/or metaxalone medications. She states that she recently started to participate in yoga classes to further alleviate neck pain symptoms.  She does not use muscle relaxer very often.      She uses estradiol 0.01 % vaginal cream for treatment of vaginal atrophy. She denies any side-effects to her estradiol cream.  Last pap was less than 1 year ago    The patient reports that she contracted COVID-19 infection in July 2024. Her symptoms have since completely resolved. She recalls that she had a surgical procedure to treat an eye condition (anatomically narrow angles) around the the same time (July 2024) following covid.  Treated w laser.    The patient recalls that she was diagnosed with osteoporosis (spine L1-L4 T-score -2.8/left femur T-score -1.7/left femoral neck T-score -2.6)  "based on DEXA bone density scan results (05/10/2024) from earlier this year. She has family history of osteoporosis.  Discussed using Prolia, unsure of insurance coverage, will see if can use.  Reviewed her dexa scan in detail today    Her most recent mammogram screening and DEXA bone density scan were done in May 2024. Her most recent colonoscopy screening was done in June 2024 (5-year recall). Her most recent gynecological exam was done in December 2023.    Review of Systems    Objective   BP 95/61   Pulse 77   Temp 37.2 °C (98.9 °F)   Ht 1.651 m (5' 5\")   Wt 66.7 kg (147 lb)   LMP  (LMP Unknown)   SpO2 96%   BMI 24.46 kg/m²     Physical Exam  Constitutional:       Appearance: Normal appearance.   Neck:      Vascular: No carotid bruit.   Cardiovascular:      Rate and Rhythm: Normal rate and regular rhythm.      Pulses: Normal pulses.      Heart sounds: Normal heart sounds.   Pulmonary:      Effort: Pulmonary effort is normal.      Breath sounds: Normal breath sounds.   Musculoskeletal:         General: Normal range of motion.      Cervical back: Normal range of motion.      Right lower leg: No edema.      Left lower leg: No edema.   Skin:     General: Skin is warm and dry.   Neurological:      General: No focal deficit present.      Mental Status: She is alert and oriented to person, place, and time.   Psychiatric:         Mood and Affect: Mood normal.         Thought Content: Thought content normal.         Judgment: Judgment normal.         Assessment/Plan   Problem List Items Addressed This Visit             ICD-10-CM    Allergic rhinitis J30.9    Mild depression - Primary F32.A    Relevant Medications    buPROPion XL (Wellbutrin XL) 150 mg 24 hr tablet    Neck pain M54.2    Relevant Medications    celecoxib (CeleBREX) 200 mg capsule    metaxalone (Skelaxin) 800 mg tablet    Age-related osteoporosis without current pathological fracture M81.0    Relevant Medications    denosumab (Prolia) 60 mg/mL syringe "     Other Visit Diagnoses         Codes    Seasonal allergies     J30.2    Relevant Medications    cetirizine-pseudoephedrine (Aller-Kassy D) 5-120 mg 12 hr tablet    Vaginal atrophy     N95.2    Relevant Medications    estradiol (Estrace) 0.01 % (0.1 mg/gram) vaginal cream               The patient received refills for current medication (bupropion  mg, Celebrex 200 mg, metaxalone 800 mg, estradiol 0.01% cream). She was instructed to continue taking medication as previously directed.    In regards to concerns with osteoporosis, the patient was advised about the advantages and disadvantages (including side-effects) of different osteoporosis medications (Prolia, etc). Following discussion, the patient received a prescription for Prolia injections. She will start on Prolia injections depending on her insurance coverage. For now, she was instructed to continue with vitamin D supplementation and weight-bearing exercise. Continue monitoring symptoms.    Prospective immunizations were discussed with the patient. She has already received her annual flu vaccine at her local pharmacy earlier this year.  Needs pneumococcal vaccine etc    She will follow-up in 6 months, unless otherwise needed.    Scribe Attestation  By signing my name below, ISenia Scribe   attest that this documentation has been prepared under the direction and in the presence of Brit Leblanc DO.

## 2024-11-15 ENCOUNTER — OFFICE VISIT (OUTPATIENT)
Dept: PRIMARY CARE | Facility: CLINIC | Age: 69
End: 2024-11-15
Payer: COMMERCIAL

## 2024-11-15 DIAGNOSIS — M81.0 AGE-RELATED OSTEOPOROSIS WITHOUT CURRENT PATHOLOGICAL FRACTURE: ICD-10-CM

## 2024-11-15 NOTE — PROGRESS NOTES
Patient came in today for 1ST PROLIA INJECTION_. Injection was given in theLEFT SUBQ. Injection tolerated well.

## 2025-01-20 DIAGNOSIS — M54.2 NECK PAIN: ICD-10-CM

## 2025-01-21 RX ORDER — METAXALONE 800 MG/1
800 TABLET ORAL 3 TIMES DAILY PRN
Qty: 30 TABLET | Refills: 0 | Status: SHIPPED | OUTPATIENT
Start: 2025-01-21

## 2025-02-05 DIAGNOSIS — M54.2 NECK PAIN: ICD-10-CM

## 2025-02-07 RX ORDER — METAXALONE 800 MG/1
800 TABLET ORAL 3 TIMES DAILY PRN
Qty: 30 TABLET | Refills: 0 | Status: SHIPPED | OUTPATIENT
Start: 2025-02-07

## 2025-03-05 DIAGNOSIS — M54.2 NECK PAIN: ICD-10-CM

## 2025-03-06 RX ORDER — METAXALONE 800 MG/1
800 TABLET ORAL 3 TIMES DAILY PRN
Qty: 30 TABLET | Refills: 0 | Status: SHIPPED | OUTPATIENT
Start: 2025-03-06

## 2025-04-01 ENCOUNTER — APPOINTMENT (OUTPATIENT)
Dept: PRIMARY CARE | Facility: CLINIC | Age: 70
End: 2025-04-01
Payer: COMMERCIAL

## 2025-04-01 VITALS
BODY MASS INDEX: 25.29 KG/M2 | WEIGHT: 152 LBS | SYSTOLIC BLOOD PRESSURE: 102 MMHG | HEART RATE: 76 BPM | OXYGEN SATURATION: 97 % | TEMPERATURE: 98 F | DIASTOLIC BLOOD PRESSURE: 63 MMHG

## 2025-04-01 DIAGNOSIS — M54.2 NECK PAIN: ICD-10-CM

## 2025-04-01 DIAGNOSIS — F32.A MILD DEPRESSION: ICD-10-CM

## 2025-04-01 DIAGNOSIS — N95.2 VAGINAL ATROPHY: ICD-10-CM

## 2025-04-01 DIAGNOSIS — M81.0 AGE-RELATED OSTEOPOROSIS WITHOUT CURRENT PATHOLOGICAL FRACTURE: ICD-10-CM

## 2025-04-01 PROCEDURE — 1123F ACP DISCUSS/DSCN MKR DOCD: CPT | Performed by: FAMILY MEDICINE

## 2025-04-01 PROCEDURE — 99214 OFFICE O/P EST MOD 30 MIN: CPT | Performed by: FAMILY MEDICINE

## 2025-04-01 PROCEDURE — 1159F MED LIST DOCD IN RCRD: CPT | Performed by: FAMILY MEDICINE

## 2025-04-01 PROCEDURE — 1160F RVW MEDS BY RX/DR IN RCRD: CPT | Performed by: FAMILY MEDICINE

## 2025-04-01 PROCEDURE — 1036F TOBACCO NON-USER: CPT | Performed by: FAMILY MEDICINE

## 2025-04-01 PROCEDURE — 1126F AMNT PAIN NOTED NONE PRSNT: CPT | Performed by: FAMILY MEDICINE

## 2025-04-01 RX ORDER — ESTRADIOL 0.1 MG/G
2 CREAM VAGINAL DAILY
Qty: 42.5 G | Refills: 2 | Status: SHIPPED | OUTPATIENT
Start: 2025-04-01

## 2025-04-01 RX ORDER — CELECOXIB 200 MG/1
CAPSULE ORAL
Qty: 100 CAPSULE | Refills: 0 | Status: SHIPPED | OUTPATIENT
Start: 2025-04-01

## 2025-04-01 RX ORDER — METAXALONE 800 MG/1
800 TABLET ORAL 3 TIMES DAILY PRN
Qty: 100 TABLET | Refills: 0 | Status: SHIPPED | OUTPATIENT
Start: 2025-04-01

## 2025-04-01 RX ORDER — BUPROPION HYDROCHLORIDE 150 MG/1
150 TABLET ORAL EVERY MORNING
Qty: 100 TABLET | Refills: 0 | Status: SHIPPED | OUTPATIENT
Start: 2025-04-01

## 2025-04-01 ASSESSMENT — PAIN SCALES - GENERAL: PAINLEVEL_OUTOF10: 0-NO PAIN

## 2025-04-01 NOTE — PATIENT INSTRUCTIONS
Nonfasting labs.  Refilled medications.    F/U w/PCP.     Lab services: [address]  Hours: M-F 7:15a-6:00p  Phone: [phone], Option 1

## 2025-04-01 NOTE — PROGRESS NOTES
Subjective   Patient ID: Carlos Layton is a 69 y.o. female who presents for Med Refill.    HPI  PCP: Dr. Walker    Has Chronic neck pain, Depression, Osteoporosis, Vaginal atrophy.  Condition(s) stable.  Taking med(s) as directed.  Requests refills.  Note: Takes Skelaxin maybe 1 tab/day.    Review of Systems  No other complaints.     Objective   /63 (BP Location: Left arm, Patient Position: Sitting, BP Cuff Size: Adult)   Pulse 76   Temp 36.7 °C (98 °F) (Temporal)   Wt 68.9 kg (152 lb)   LMP  (LMP Unknown)   SpO2 97%   BMI 25.29 kg/m²     Physical Exam  Constitutional:       General: She is not in acute distress.     Appearance: She is overweight.   Cardiovascular:      Rate and Rhythm: Normal rate and regular rhythm.      Heart sounds: Normal heart sounds. No murmur heard.     No friction rub. No gallop.   Pulmonary:      Effort: Pulmonary effort is normal.      Breath sounds: Normal breath sounds. No wheezing, rhonchi or rales.   Neurological:      Mental Status: She is oriented to person, place, and time.   Psychiatric:         Mood and Affect: Mood normal.         Behavior: Behavior normal.     Assessment/Plan   Diagnoses and all orders for this visit:  Mild depression  -     buPROPion XL (Wellbutrin XL) 150 mg 24 hr tablet; Take 1 tablet (150 mg) by mouth once daily in the morning. Do not crush, chew, or split.  Neck pain  -     celecoxib (CeleBREX) 200 mg capsule; TAKE ONE CAPSULE BY MOUTH DAILY AS NEEDED  -     metaxalone (Skelaxin) 800 mg tablet; Take 1 tablet (800 mg) by mouth 3 times a day as needed for muscle spasms.  -     Basic Metabolic Panel; Future  Age-related osteoporosis without current pathological fracture  -     denosumab (Prolia) 60 mg/mL syringe; Inject 1 mL (60 mg total) under the skin every 6 months.  Vaginal atrophy  -     estradiol (Estrace) 0.01 % (0.1 mg/gram) vaginal cream; Insert 0.5 Applicatorfuls (2 g) into the vagina once daily. 3 days per week    Nonfasting  labs.  Refilled medications.    F/U w/PCP.

## 2025-04-10 ENCOUNTER — TELEPHONE (OUTPATIENT)
Dept: PRIMARY CARE | Facility: CLINIC | Age: 70
End: 2025-04-10
Payer: COMMERCIAL

## 2025-04-10 DIAGNOSIS — F32.A MILD DEPRESSION: ICD-10-CM

## 2025-04-10 DIAGNOSIS — M54.2 NECK PAIN: ICD-10-CM

## 2025-04-10 DIAGNOSIS — J30.2 SEASONAL ALLERGIES: ICD-10-CM

## 2025-04-10 RX ORDER — METAXALONE 800 MG/1
800 TABLET ORAL 3 TIMES DAILY PRN
Qty: 270 TABLET | Refills: 1 | Status: SHIPPED | OUTPATIENT
Start: 2025-04-10

## 2025-04-10 RX ORDER — CETIRIZINE HYDROCHLORIDE, PSEUDOEPHEDRINE HYDROCHLORIDE 5; 120 MG/1; MG/1
1 TABLET, FILM COATED, EXTENDED RELEASE ORAL 2 TIMES DAILY PRN
Qty: 180 TABLET | Refills: 1 | Status: SHIPPED | OUTPATIENT
Start: 2025-04-10 | End: 2026-04-10

## 2025-04-10 RX ORDER — CELECOXIB 200 MG/1
CAPSULE ORAL
Qty: 90 CAPSULE | Refills: 1 | Status: SHIPPED | OUTPATIENT
Start: 2025-04-10

## 2025-04-10 RX ORDER — BUPROPION HYDROCHLORIDE 150 MG/1
150 TABLET ORAL EVERY MORNING
Qty: 90 TABLET | Refills: 1 | Status: SHIPPED | OUTPATIENT
Start: 2025-04-10

## 2025-04-10 NOTE — TELEPHONE ENCOUNTER
Pt wanting to know if you can send her in a full 90 day rx for these medications. Dr fajardo did not send in a full 90 days with refills and her insurance won't cover unless it is.      Medication Name: Bupropion XL  Dose: 150 mg 24 hr tablet   Frequency: Take 1 tablet by mouth once daily in the morning     Medication Name: Celecoxib   Dose: 200 mg capsule   Frequency: Take one capsule by mouth daily as needed     Medication Name: Metaxalone   Dose: 800 mg tablet   Frequency: Take 1 tablet by mouth 3 times a day as needed for muscle spasms    Medication Name: Catirizine-pseudoephedrine  Dose: 5-120 mg 12 hr tablet   Frequency: Take 1 tablet by mouth 2 times a day as needed for allergies     Pharmacy: [facility]     Last appointment: 4/1/25  Last CPE:  Last MCW:  Next appointment:   Next CPE:  Next MCW:

## 2025-04-24 ENCOUNTER — TELEPHONE (OUTPATIENT)
Dept: PRIMARY CARE | Facility: CLINIC | Age: 70
End: 2025-04-24
Payer: COMMERCIAL

## 2025-04-24 NOTE — TELEPHONE ENCOUNTER
I authorized pharmacy to deliver prolia 5/1/25 it should here by that date. Could you call pt when it arrives

## 2025-05-19 ENCOUNTER — CLINICAL SUPPORT (OUTPATIENT)
Dept: PRIMARY CARE | Facility: CLINIC | Age: 70
End: 2025-05-19
Payer: COMMERCIAL

## 2025-05-19 DIAGNOSIS — M81.0 AGE-RELATED OSTEOPOROSIS WITHOUT CURRENT PATHOLOGICAL FRACTURE: ICD-10-CM

## 2025-05-19 PROCEDURE — 96372 THER/PROPH/DIAG INJ SC/IM: CPT | Performed by: FAMILY MEDICINE

## 2025-07-14 ENCOUNTER — OFFICE VISIT (OUTPATIENT)
Dept: PRIMARY CARE | Facility: CLINIC | Age: 70
End: 2025-07-14
Payer: COMMERCIAL

## 2025-07-14 VITALS
RESPIRATION RATE: 16 BRPM | OXYGEN SATURATION: 97 % | HEART RATE: 73 BPM | BODY MASS INDEX: 24.66 KG/M2 | SYSTOLIC BLOOD PRESSURE: 105 MMHG | DIASTOLIC BLOOD PRESSURE: 70 MMHG | WEIGHT: 148.2 LBS

## 2025-07-14 DIAGNOSIS — M54.50 ACUTE RIGHT-SIDED LOW BACK PAIN WITHOUT SCIATICA: Primary | ICD-10-CM

## 2025-07-14 PROCEDURE — 1160F RVW MEDS BY RX/DR IN RCRD: CPT | Performed by: FAMILY MEDICINE

## 2025-07-14 PROCEDURE — 1159F MED LIST DOCD IN RCRD: CPT | Performed by: FAMILY MEDICINE

## 2025-07-14 PROCEDURE — 1036F TOBACCO NON-USER: CPT | Performed by: FAMILY MEDICINE

## 2025-07-14 PROCEDURE — 99214 OFFICE O/P EST MOD 30 MIN: CPT | Performed by: FAMILY MEDICINE

## 2025-07-14 RX ORDER — METHYLPREDNISOLONE 4 MG/1
TABLET ORAL
Qty: 21 TABLET | Refills: 0 | Status: SHIPPED | OUTPATIENT
Start: 2025-07-14 | End: 2025-07-20

## 2025-07-14 NOTE — PROGRESS NOTES
Subjective   Patient ID: Carlos Layton is a 69 y.o. female who presents for Back Pain.    HPI  PCP:: Dr. Walker    Right low back pain x 2-3 wks.  Onset a couple days after helping her daughter move (carrying boxes, etc).  Described as dull, ache, stiff.  Intermittent.  Worse w/standing from seated or laying position.  Improved w/nothing (tried Skelaxin, Celebrex).  Max 4/10.  Ave 4/10.  Currently 3/10.  No radiation to LEs.  No saddle anesthesia or incontinence.    Review of Systems  No other complaints.     Objective   /70   Pulse 73   Resp 16   Wt 67.2 kg (148 lb 3.2 oz)   LMP  (LMP Unknown)   SpO2 97%   BMI 24.66 kg/m²     Physical Exam  Constitutional:       General: She is not in acute distress.     Appearance: She is normal weight.   Musculoskeletal:      Lumbar back: No tenderness (muscular) or bony tenderness. Normal range of motion. Negative right straight leg raise test and negative left straight leg raise test.   Neurological:      Mental Status: She is oriented to person, place, and time.   Psychiatric:         Mood and Affect: Mood normal.         Behavior: Behavior normal.     Assessment/Plan   Diagnoses and all orders for this visit:  Acute right-sided low back pain without sciatica  -     methylPREDNISolone (Medrol Dospak) 4 mg tablets; Take as directed on package.    Oral steroid provided (do not take NSAIDs while taking steroid).  Will consider physical therapy referral if symptoms persist.     Follow up with PCP if these symptoms worsen or fail to improve as anticipated.